# Patient Record
Sex: FEMALE | Race: WHITE | Employment: UNEMPLOYED | URBAN - METROPOLITAN AREA
[De-identification: names, ages, dates, MRNs, and addresses within clinical notes are randomized per-mention and may not be internally consistent; named-entity substitution may affect disease eponyms.]

---

## 2018-04-27 ENCOUNTER — OFFICE VISIT (OUTPATIENT)
Dept: CARDIOLOGY CLINIC | Age: 63
End: 2018-04-27
Payer: MEDICAID

## 2018-04-27 VITALS
HEART RATE: 82 BPM | WEIGHT: 180 LBS | DIASTOLIC BLOOD PRESSURE: 70 MMHG | RESPIRATION RATE: 16 BRPM | HEIGHT: 66 IN | SYSTOLIC BLOOD PRESSURE: 128 MMHG | BODY MASS INDEX: 28.93 KG/M2

## 2018-04-27 DIAGNOSIS — R07.9 CHEST PAIN, UNSPECIFIED TYPE: ICD-10-CM

## 2018-04-27 PROCEDURE — 99204 OFFICE O/P NEW MOD 45 MIN: CPT | Performed by: INTERNAL MEDICINE

## 2018-04-27 PROCEDURE — 93000 ELECTROCARDIOGRAM COMPLETE: CPT | Performed by: INTERNAL MEDICINE

## 2018-04-27 PROCEDURE — G8427 DOCREV CUR MEDS BY ELIG CLIN: HCPCS | Performed by: INTERNAL MEDICINE

## 2018-04-27 PROCEDURE — G8419 CALC BMI OUT NRM PARAM NOF/U: HCPCS | Performed by: INTERNAL MEDICINE

## 2018-04-27 PROCEDURE — 3017F COLORECTAL CA SCREEN DOC REV: CPT | Performed by: INTERNAL MEDICINE

## 2018-04-27 PROCEDURE — 4004F PT TOBACCO SCREEN RCVD TLK: CPT | Performed by: INTERNAL MEDICINE

## 2018-04-27 RX ORDER — TAMSULOSIN HYDROCHLORIDE 0.4 MG/1
0.4 CAPSULE ORAL DAILY
COMMUNITY
Start: 2018-04-15

## 2018-04-27 RX ORDER — EZETIMIBE 10 MG/1
10 TABLET ORAL DAILY
COMMUNITY
Start: 2018-04-02

## 2018-04-27 RX ORDER — LEVOTHYROXINE SODIUM 88 UG/1
75 TABLET ORAL DAILY
COMMUNITY
Start: 2018-03-02

## 2018-04-27 RX ORDER — AMITRIPTYLINE HYDROCHLORIDE 50 MG/1
50 TABLET, FILM COATED ORAL NIGHTLY
COMMUNITY
Start: 2018-03-22

## 2018-04-27 RX ORDER — OXYCODONE HYDROCHLORIDE AND ACETAMINOPHEN 5; 325 MG/1; MG/1
1 TABLET ORAL EVERY 4 HOURS PRN
COMMUNITY
Start: 2018-04-13

## 2018-04-27 RX ORDER — BACLOFEN 10 MG/1
10 TABLET ORAL
COMMUNITY
Start: 2018-03-01

## 2018-04-27 RX ORDER — AMPICILLIN 500 MG/1
500 CAPSULE ORAL
COMMUNITY
Start: 2018-04-19

## 2018-04-27 RX ORDER — CETIRIZINE HYDROCHLORIDE 10 MG/1
10 TABLET ORAL DAILY
COMMUNITY
Start: 2018-04-02

## 2018-04-27 RX ORDER — GABAPENTIN 300 MG/1
300 CAPSULE ORAL 4 TIMES DAILY
COMMUNITY
Start: 2018-03-14

## 2018-04-27 RX ORDER — GABAPENTIN 600 MG/1
600 TABLET ORAL DAILY
COMMUNITY
Start: 2018-03-01

## 2021-08-27 ENCOUNTER — OFFICE VISIT (OUTPATIENT)
Dept: NEUROLOGY | Age: 66
End: 2021-08-27
Payer: MEDICARE

## 2021-08-27 VITALS
WEIGHT: 180 LBS | HEIGHT: 66 IN | DIASTOLIC BLOOD PRESSURE: 60 MMHG | SYSTOLIC BLOOD PRESSURE: 110 MMHG | BODY MASS INDEX: 28.93 KG/M2

## 2021-08-27 DIAGNOSIS — I67.2 CEREBRAL ARTERIOSCLEROSIS: Primary | ICD-10-CM

## 2021-08-27 DIAGNOSIS — H53.19 OTHER SUBJECTIVE VISUAL DISTURBANCES: ICD-10-CM

## 2021-08-27 DIAGNOSIS — R42 DIZZINESS AND GIDDINESS: ICD-10-CM

## 2021-08-27 DIAGNOSIS — R26.89 IMBALANCE: ICD-10-CM

## 2021-08-27 DIAGNOSIS — E78.5 HYPERLIPIDEMIA, UNSPECIFIED HYPERLIPIDEMIA TYPE: ICD-10-CM

## 2021-08-27 DIAGNOSIS — Z86.69 HISTORY OF OTITIS: Chronic | ICD-10-CM

## 2021-08-27 DIAGNOSIS — F17.200 TOBACCO USE DISORDER: ICD-10-CM

## 2021-08-27 DIAGNOSIS — H93.13 TINNITUS AURIUM, BILATERAL: Chronic | ICD-10-CM

## 2021-08-27 PROBLEM — G62.9 PERIPHERAL NEUROPATHY: Chronic | Status: ACTIVE | Noted: 2021-08-27

## 2021-08-27 PROCEDURE — G8427 DOCREV CUR MEDS BY ELIG CLIN: HCPCS | Performed by: PSYCHIATRY & NEUROLOGY

## 2021-08-27 PROCEDURE — G8400 PT W/DXA NO RESULTS DOC: HCPCS | Performed by: PSYCHIATRY & NEUROLOGY

## 2021-08-27 PROCEDURE — 3017F COLORECTAL CA SCREEN DOC REV: CPT | Performed by: PSYCHIATRY & NEUROLOGY

## 2021-08-27 PROCEDURE — 99204 OFFICE O/P NEW MOD 45 MIN: CPT | Performed by: PSYCHIATRY & NEUROLOGY

## 2021-08-27 PROCEDURE — 1123F ACP DISCUSS/DSCN MKR DOCD: CPT | Performed by: PSYCHIATRY & NEUROLOGY

## 2021-08-27 PROCEDURE — 4004F PT TOBACCO SCREEN RCVD TLK: CPT | Performed by: PSYCHIATRY & NEUROLOGY

## 2021-08-27 PROCEDURE — 1090F PRES/ABSN URINE INCON ASSESS: CPT | Performed by: PSYCHIATRY & NEUROLOGY

## 2021-08-27 PROCEDURE — 4040F PNEUMOC VAC/ADMIN/RCVD: CPT | Performed by: PSYCHIATRY & NEUROLOGY

## 2021-08-27 PROCEDURE — G8417 CALC BMI ABV UP PARAM F/U: HCPCS | Performed by: PSYCHIATRY & NEUROLOGY

## 2021-08-27 RX ORDER — ASPIRIN 81 MG/1
81 TABLET ORAL DAILY
COMMUNITY

## 2021-08-27 RX ORDER — LOVASTATIN 40 MG/1
TABLET ORAL
COMMUNITY
Start: 2021-08-11

## 2021-08-27 ASSESSMENT — ENCOUNTER SYMPTOMS
GASTROINTESTINAL NEGATIVE: 1
ALLERGIC/IMMUNOLOGIC NEGATIVE: 1

## 2021-08-27 NOTE — PROGRESS NOTES
Neurology Consult Note:    Patient: Ledy Laura  : 1955  Date: 21  Referring provider: Jeff Salas MD    Referral to Neurology: Intermittent dizziness and lightheadedness x 1 yr. Or more. Hx of peripheral neuropathy with numbness and tingling of feet and toes. Dear Jeff Salas MD     Thank you for your referral of Ledy Laura to the Neurology clinic, an alert, anxious 18-year-old woman with history of chronic intermittent dizziness and lightheadedness symptoms for 1 or more years, chronic tinnitus, fullness sensation of both ears and history of otitis, referred for neurologic consultation. She  denies a spinning vertigo sensation. She denies hearing loss. She has never been evaluated by ENT. She has been prescribed multiple psychotropic and other pain medications in the past including amitriptyline, gabapentin, baclofen, and as needed Percocet as reviewed in her medication list.  She is no longer taking Percocet. She has had no recent brain or spine imaging. She reports completing labs 3 months ago however we do not have the results. She has seen Dr. Slater Kehr, neurologist, in the past for symptoms of dizziness and peripheral neuropathy but did not receive a diagnosis by her account. She denies TIA/stroke symptoms or remitting/relapsing symptoms such as hemiparesis, hemisensory loss, facial droop, sudden visual loss, slurred speech, spinning vertigo, confusion, nausea or vomiting, progressive headaches, tremor, or loss of consciousness. The dizziness symptoms sometimes cause her to lose balance and once she fell as a result. There is no history of concussion or significant closed head injury. An MRI brain scan with and without contrast, 4/3/2018, completed at [facility] was reported to be abnormal, reported by the radiologist there to be suspicious for white matter disease. Please refer to the report for additional information.     Lab Data: None recent in Epic/media. Reported completing labs 3 months ago in doctor's office, no results available. Imaging Data: 4/3/2018, MR brain scan study with and without contrast, [facility], reported as showing more than 22-7 mm white matter abnormalities with involvement of corpus callosum which radiology felt was suspicious for multiple sclerosis. There was no infarct, mass, mass-effect or hemorrhage and reported no underlying atrophy or evidence of small vessel ischemic disease. Unremarkable IACs. Current Outpatient Medications   Medication Sig Dispense Refill    lovastatin (MEVACOR) 40 MG tablet TAKE 1 TABLET BY MOUTH EVERY DAY with evening meal      aspirin 81 MG EC tablet Take 81 mg by mouth daily      ALBUTEROL IN Inhale into the lungs      Ferrous Sulfate (IRON PO) Take by mouth      amitriptyline (ELAVIL) 50 MG tablet Take 50 mg by mouth nightly       cetirizine (ZYRTEC) 10 MG tablet Take 10 mg by mouth daily       Omeprazole 20 MG TBDD Take 20 mg by mouth daily       gabapentin (NEURONTIN) 300 MG capsule Take 300 mg by mouth 4 times daily.  levothyroxine (SYNTHROID) 88 MCG tablet Take 75 mcg by mouth daily       baclofen (LIORESAL) 10 MG tablet Take 10 mg by mouth       ezetimibe (ZETIA) 10 MG tablet Take 10 mg by mouth daily       VENTOLIN  (90 Base) MCG/ACT inhaler Inhale 1 puff into the lungs 4 times daily  (Patient not taking: Reported on 8/27/2021)      ampicillin (PRINCIPEN) 500 MG capsule Take 500 mg by mouth  (Patient not taking: Reported on 8/27/2021)      gabapentin (NEURONTIN) 600 MG tablet Take 600 mg by mouth daily. . (Patient not taking: Reported on 8/27/2021)      tamsulosin (FLOMAX) 0.4 MG capsule Take 0.4 mg by mouth daily  (Patient not taking: Reported on 8/27/2021)      oxyCODONE-acetaminophen (PERCOCET) 5-325 MG per tablet Take 1 tablet by mouth every 4 hours as needed.  . (Patient not taking: Reported on 8/27/2021)       No current facility-administered medications for this visit. Allergies   Allergen Reactions    Hydrocodone Rash    Cephalexin        Patient Active Problem List   Diagnosis    Chest pain    Dizziness and giddiness    Peripheral neuropathy    Hyperlipidemia    Tinnitus aurium, bilateral    History of otitis    Tobacco use disorder       Past Medical History:   Diagnosis Date    Dizziness and giddiness 8/27/2021    History of otitis 8/27/2021    Hyperlipidemia 8/27/2021    Peripheral neuropathy 8/27/2021    Tinnitus aurium, bilateral 8/27/2021    Tobacco use disorder 8/27/2021       No past surgical history on file. No family history on file. Social History     Socioeconomic History    Marital status:      Spouse name: Not on file    Number of children: Not on file    Years of education: Not on file    Highest education level: Not on file   Occupational History    Not on file   Tobacco Use    Smoking status: Current Every Day Smoker     Packs/day: 1.00    Smokeless tobacco: Never Used   Substance and Sexual Activity    Alcohol use: No    Drug use: No    Sexual activity: Not on file   Other Topics Concern    Not on file   Social History Narrative    Not on file     Social Determinants of Health     Financial Resource Strain:     Difficulty of Paying Living Expenses:    Food Insecurity:     Worried About Running Out of Food in the Last Year:     [address] in the Last Year:    Transportation Needs:     Lack of Transportation (Medical):      Lack of Transportation (Non-Medical):    Physical Activity:     Days of Exercise per Week:     Minutes of Exercise per Session:    Stress:     Feeling of Stress :    Social Connections:     Frequency of Communication with Friends and Family:     Frequency of Social Gatherings with Friends and Family:     Attends Worship Services:     Active Member of Clubs or Organizations:     Attends Club or Organization Meetings:     Marital Status:    Intimate Partner Violence:     Fear of Current or Ex-Partner:     Emotionally Abused:     Physically Abused:     Sexually Abused:      Review of Systems   Constitutional: Negative. HENT: Negative. Eyes: Positive for visual disturbance. Respiratory:        Copd   Cardiovascular: Negative. Gastrointestinal: Negative. Endocrine: Negative. Genitourinary: Negative. Musculoskeletal:        Hx gait imbalance   Skin: Negative. Allergic/Immunologic: Negative. Neurological: Positive for dizziness, light-headedness and numbness. Hx peripheral neuropathy, feet   Hematological: Negative. Psychiatric/Behavioral: The patient is nervous/anxious. Neurologic Exam:  /60 (Site: Left Upper Arm, Position: Sitting, Cuff Size: Medium Adult)   Ht 5' 6\" (1.676 m)   Wt 180 lb (81.6 kg)   BMI 29.05 kg/m²   General appearance: Alert, cooperative, anxious, obese, well-nourished, well-groomed, seated in the exam room, no acute distress. HEENT: Normocephalic/atraumatic. Neck: Supple, no carotid bruits. Cardiac: RRR  Respiratory: grossly clear  Extremities: No edema, erythema or cyanosis  Skin: No apparent lesions or rashes; positive tattoos. Musculoskeletal: No fasciculations or tremors, no foot drop, negative bilateral straight leg raising test, no truncal sensory level. Mental Status: Alert, oriented x3  Speech/Language: Clear, grossly fluent  Attention span/Concentration: Grossly intact, tangential  Affect/Mood:  Moderately anxious  Insight/Judgement: [employer] of Knowledge/Current events: Tangential, unable to accurately assess  CN II-XII:     Pupils: Equal, reactive to light, 3.0 mm     EOM's: Full without nystagmus  Visual Fields: Full to confrontation  Fundi: Miosis to light, unable to well-visualized  CN V: normal V1-V3  CN VII: No facial droop  CN VIII: Hearing reported as grossly intact, chronic tinnitus  CN IX-XII: No palatal asymmetry, tongue reduplicate labs that were recently done. 6.  Follow-up in the Neurology clinic within 4 weeks post testing if clinically indicated. 7.  Patient information was provided on dizziness and nonspecific white matter changes related to MR brain scans. Sincerely,      Silke Mendez MD    This note was created using speech recognition transcription software. Despite proofreading, there may be several typographical errors present that may affect the meaning of the content. Please call with any questions. Note: A total time of 40 mins. was spent on the date of service in preparation for this visit, which included face-to-face patient care and completing clinical documentation, and including counseling and coordination of care based on clinical impression, neurologic diagnosis, review of pertinent imaging studies, test results, implementation and discussion of treatment plan, risk factor reduction and patient and/or family education.     Orders Placed This Encounter   Procedures    MRI CERVICAL SPINE W WO CONTRAST     Standing Status:   Future     Standing Expiration Date:   10/27/2021     Order Specific Question:   Reason for exam:     Answer:   evaluate for DJD, demyelination    MRI BRAIN W WO CONTRAST     Standing Status:   Future     Standing Expiration Date:   8/27/2022     Order Specific Question:   Reason for exam:     Answer:   evaluate for primary demyelination, infarct, mass, hemorrhage    Vitamin B12 & Folate     Standing Status:   Future     Standing Expiration Date:   8/27/2022    Lyme Disease Acute Reflexive Panel     Standing Status:   Future     Standing Expiration Date:   8/27/2022    Ammonia     Standing Status:   Future     Standing Expiration Date:   8/27/2022    TSH without Reflex     Standing Status:   Future     Standing Expiration Date:   8/27/2022    T4, Free     Standing Status:   Future     Standing Expiration Date:   8/27/2022    Sjogren's Ab (SS-A, SS-B)     Standing Status:

## 2021-09-15 ENCOUNTER — TELEPHONE (OUTPATIENT)
Dept: NEUROLOGY | Age: 66
End: 2021-09-15

## 2025-03-26 ENCOUNTER — OFFICE VISIT (OUTPATIENT)
Dept: ENDOCRINOLOGY | Age: 70
End: 2025-03-26
Payer: MEDICARE

## 2025-03-26 VITALS
OXYGEN SATURATION: 96 % | BODY MASS INDEX: 28.77 KG/M2 | DIASTOLIC BLOOD PRESSURE: 61 MMHG | WEIGHT: 179 LBS | HEART RATE: 62 BPM | HEIGHT: 66 IN | SYSTOLIC BLOOD PRESSURE: 106 MMHG

## 2025-03-26 DIAGNOSIS — E55.9 VITAMIN D DEFICIENCY: ICD-10-CM

## 2025-03-26 DIAGNOSIS — E03.9 ACQUIRED HYPOTHYROIDISM: ICD-10-CM

## 2025-03-26 DIAGNOSIS — E78.2 MIXED HYPERLIPIDEMIA: Primary | ICD-10-CM

## 2025-03-26 PROCEDURE — 3017F COLORECTAL CA SCREEN DOC REV: CPT | Performed by: CLINICAL NURSE SPECIALIST

## 2025-03-26 PROCEDURE — G8400 PT W/DXA NO RESULTS DOC: HCPCS | Performed by: CLINICAL NURSE SPECIALIST

## 2025-03-26 PROCEDURE — 4004F PT TOBACCO SCREEN RCVD TLK: CPT | Performed by: CLINICAL NURSE SPECIALIST

## 2025-03-26 PROCEDURE — 1090F PRES/ABSN URINE INCON ASSESS: CPT | Performed by: CLINICAL NURSE SPECIALIST

## 2025-03-26 PROCEDURE — G8419 CALC BMI OUT NRM PARAM NOF/U: HCPCS | Performed by: CLINICAL NURSE SPECIALIST

## 2025-03-26 PROCEDURE — 1123F ACP DISCUSS/DSCN MKR DOCD: CPT | Performed by: CLINICAL NURSE SPECIALIST

## 2025-03-26 PROCEDURE — G8427 DOCREV CUR MEDS BY ELIG CLIN: HCPCS | Performed by: CLINICAL NURSE SPECIALIST

## 2025-03-26 PROCEDURE — 1159F MED LIST DOCD IN RCRD: CPT | Performed by: CLINICAL NURSE SPECIALIST

## 2025-03-26 PROCEDURE — 99205 OFFICE O/P NEW HI 60 MIN: CPT | Performed by: CLINICAL NURSE SPECIALIST

## 2025-03-26 RX ORDER — FUROSEMIDE 40 MG/1
40 TABLET ORAL 2 TIMES DAILY
COMMUNITY

## 2025-03-26 RX ORDER — FERROUS SULFATE 325(65) MG
325 TABLET ORAL
COMMUNITY

## 2025-03-26 RX ORDER — TIOTROPIUM BROMIDE INHALATION SPRAY 1.56 UG/1
2 SPRAY, METERED RESPIRATORY (INHALATION) DAILY
COMMUNITY

## 2025-03-26 RX ORDER — DONEPEZIL HYDROCHLORIDE 10 MG/1
10 TABLET, FILM COATED ORAL NIGHTLY
COMMUNITY

## 2025-03-26 RX ORDER — NICOTINE POLACRILEX 2 MG
GUM BUCCAL
COMMUNITY

## 2025-03-26 RX ORDER — PHENOL 1.4 %
1 AEROSOL, SPRAY (ML) MUCOUS MEMBRANE DAILY
COMMUNITY

## 2025-03-26 RX ORDER — MULTIVIT-MIN/IRON/FOLIC ACID/K 18-600-40
2000 CAPSULE ORAL DAILY
COMMUNITY

## 2025-03-26 NOTE — PROGRESS NOTES
[facility]  [facility] Department of Endocrinology Diabetes and Metabolism   [address], [place], [place], [address]  Phone: [phone]  Fax: [phone]    Date of Service: 3/26/2025    Primary Care Physician: Ingris Mcginnis MD  Referring physician: Ingris Mcginnis MD  Provider: ANA Domínguez CNS     Reason for the visit:  Hyperlipidemia      History of Present Illness:  The history is provided by the patient. No  was used. Accuracy of the patient data is excellent.  Ilene Orellana is a very pleasant 69 y.o. female seen today for hyperlipidemia      First diagnosed: dx over 1 year ago   Context: Dx on BW   Patient complaining of: Denies  Labs: 5/24 total cholesterol 169, HDL 44, LDL 85, triglycerides 249  Patient does have history of prediabetes, hypothyroidism, ex smoker.  Stopped smoking 2 years ago    History of hypothyroidism  Currently on levothyroxine 100 mcg 1 tablet daily  Patient takes calcium and iron supplementation close to taking thyroid hormone  Last TFTs within normal limits    Patient had recent labs performed this week    PAST MEDICAL HISTORY   Past Medical History:   Diagnosis Date    Dizziness and giddiness 8/27/2021    History of otitis 8/27/2021    Hyperlipidemia 8/27/2021    Peripheral neuropathy 8/27/2021    Tinnitus aurium, bilateral 8/27/2021    Tobacco use disorder 8/27/2021       PAST SURGICAL HISTORY   No past surgical history on file.    SOCIAL HISTORY   Tobacco:   reports that she has been smoking cigarettes. She has never used smokeless tobacco.  Alcohol:   reports no history of alcohol use.  Drugs:   reports no history of drug use.    FAMILY HISTORY   No family history on file.    ALLERGIES AND DRUG REACTIONS   Allergies   Allergen Reactions    Cephalexin Rash    Hydrocodone Rash       CURRENT MEDICATIONS   Current Outpatient Medications   Medication Sig Dispense Refill    calcium carbonate 600 MG TABS tablet Take 1